# Patient Record
Sex: MALE | Race: WHITE | NOT HISPANIC OR LATINO
[De-identification: names, ages, dates, MRNs, and addresses within clinical notes are randomized per-mention and may not be internally consistent; named-entity substitution may affect disease eponyms.]

---

## 2017-01-05 ENCOUNTER — MOBILE ON CALL (OUTPATIENT)
Age: 22
End: 2017-01-05

## 2017-01-05 DIAGNOSIS — M51.16 INTERVERTEBRAL DISC DISORDERS WITH RADICULOPATHY, LUMBAR REGION: ICD-10-CM

## 2017-01-05 DIAGNOSIS — M54.41 LUMBAGO WITH SCIATICA, RIGHT SIDE: ICD-10-CM

## 2017-01-05 DIAGNOSIS — G89.29 LUMBAGO WITH SCIATICA, RIGHT SIDE: ICD-10-CM

## 2017-06-05 ENCOUNTER — FORM ENCOUNTER (OUTPATIENT)
Age: 22
End: 2017-06-05

## 2017-06-06 ENCOUNTER — APPOINTMENT (OUTPATIENT)
Dept: ORTHOPEDIC SURGERY | Facility: CLINIC | Age: 22
End: 2017-06-06

## 2017-06-06 ENCOUNTER — OUTPATIENT (OUTPATIENT)
Dept: OUTPATIENT SERVICES | Facility: HOSPITAL | Age: 22
LOS: 1 days | End: 2017-06-06
Payer: COMMERCIAL

## 2017-06-06 VITALS
SYSTOLIC BLOOD PRESSURE: 120 MMHG | WEIGHT: 191 LBS | DIASTOLIC BLOOD PRESSURE: 70 MMHG | BODY MASS INDEX: 25.31 KG/M2 | HEIGHT: 73 IN

## 2017-06-06 DIAGNOSIS — T14.8 OTHER INJURY OF UNSPECIFIED BODY REGION: ICD-10-CM

## 2017-06-06 PROCEDURE — 73590 X-RAY EXAM OF LOWER LEG: CPT

## 2017-06-06 PROCEDURE — 73590 X-RAY EXAM OF LOWER LEG: CPT | Mod: 26,LT

## 2017-10-30 ENCOUNTER — APPOINTMENT (OUTPATIENT)
Dept: VASCULAR SURGERY | Facility: CLINIC | Age: 22
End: 2017-10-30
Payer: COMMERCIAL

## 2017-10-30 ENCOUNTER — APPOINTMENT (OUTPATIENT)
Dept: ORTHOPEDIC SURGERY | Facility: CLINIC | Age: 22
End: 2017-10-30
Payer: COMMERCIAL

## 2017-10-30 PROCEDURE — 99213 OFFICE O/P EST LOW 20 MIN: CPT

## 2017-10-30 PROCEDURE — 93971 EXTREMITY STUDY: CPT

## 2018-05-15 ENCOUNTER — APPOINTMENT (OUTPATIENT)
Dept: ORTHOPEDIC SURGERY | Facility: CLINIC | Age: 23
End: 2018-05-15
Payer: COMMERCIAL

## 2018-05-15 DIAGNOSIS — M25.461 EFFUSION, RIGHT KNEE: ICD-10-CM

## 2018-05-15 PROCEDURE — 99214 OFFICE O/P EST MOD 30 MIN: CPT

## 2018-05-15 RX ORDER — TRAMADOL HYDROCHLORIDE 50 MG/1
50 TABLET, COATED ORAL
Qty: 30 | Refills: 0 | Status: DISCONTINUED | COMMUNITY
Start: 2017-10-30 | End: 2018-05-15

## 2018-05-25 ENCOUNTER — APPOINTMENT (OUTPATIENT)
Dept: PEDIATRICS | Facility: CLINIC | Age: 23
End: 2018-05-25
Payer: COMMERCIAL

## 2018-05-25 VITALS — TEMPERATURE: 98.1 F

## 2018-05-25 DIAGNOSIS — J35.8 OTHER CHRONIC DISEASES OF TONSILS AND ADENOIDS: ICD-10-CM

## 2018-05-25 PROCEDURE — 99214 OFFICE O/P EST MOD 30 MIN: CPT

## 2018-05-26 PROBLEM — J35.8 TONSILLOLITH: Status: ACTIVE | Noted: 2018-05-26

## 2018-05-26 NOTE — HISTORY OF PRESENT ILLNESS
[de-identified] : throat [FreeTextEntry6] : sees something"off/white" stuck on tonsils. insisted this be checked out

## 2018-05-26 NOTE — PHYSICAL EXAM
[No Acute Distress] : no acute distress [Normocephalic] : normocephalic [EOMI] : EOMI [Clear TM bilaterally] : clear tympanic membranes bilaterally [Nonerythematous Oropharynx] : nonerythematous oropharynx [Nontender Cervical Lymph Nodes] : nontender cervical lymph nodes [Clear to Ausculatation Bilaterally] : clear to auscultation bilaterally [Regular Rate and Rhythm] : regular rate and rhythm [Soft] : soft [NonTender] : non tender [Non Distended] : non distended [Malcolm: ____] : Malcolm [unfilled] [No Abnormal Lymph Nodes Palpated] : no abnormal lymph nodes palpated [Moves All Extremities x 4] : moves all extremities x4 [Straight] : straight [Normotonic] : normotonic [NL] : warm [de-identified] : off white concretion attached to R Tonsil, was able to remove most of it with Tongue blade

## 2018-06-12 ENCOUNTER — RX RENEWAL (OUTPATIENT)
Age: 23
End: 2018-06-12

## 2018-10-30 ENCOUNTER — APPOINTMENT (OUTPATIENT)
Dept: ORTHOPEDIC SURGERY | Facility: CLINIC | Age: 23
End: 2018-10-30
Payer: COMMERCIAL

## 2018-10-30 VITALS
DIASTOLIC BLOOD PRESSURE: 70 MMHG | SYSTOLIC BLOOD PRESSURE: 120 MMHG | BODY MASS INDEX: 25.84 KG/M2 | OXYGEN SATURATION: 97 % | HEIGHT: 73 IN | HEART RATE: 76 BPM | WEIGHT: 195 LBS

## 2018-10-30 PROCEDURE — 20610 DRAIN/INJ JOINT/BURSA W/O US: CPT | Mod: RT

## 2018-10-30 RX ORDER — HYALURONATE SOD, CROSS-LINKED 30 MG/3 ML
30 SYRINGE (ML) INTRAARTICULAR
Qty: 3 | Refills: 0 | Status: DISCONTINUED | COMMUNITY
Start: 2018-06-12 | End: 2018-10-30

## 2018-11-07 ENCOUNTER — APPOINTMENT (OUTPATIENT)
Dept: ORTHOPEDIC SURGERY | Facility: CLINIC | Age: 23
End: 2018-11-07
Payer: COMMERCIAL

## 2018-11-07 PROCEDURE — 20610 DRAIN/INJ JOINT/BURSA W/O US: CPT | Mod: RT

## 2018-11-14 ENCOUNTER — APPOINTMENT (OUTPATIENT)
Dept: ORTHOPEDIC SURGERY | Facility: CLINIC | Age: 23
End: 2018-11-14
Payer: COMMERCIAL

## 2018-11-14 PROCEDURE — 20610 DRAIN/INJ JOINT/BURSA W/O US: CPT | Mod: RT

## 2019-02-04 PROBLEM — M51.16 LUMBAR DISC HERNIATION WITH RADICULOPATHY: Status: ACTIVE | Noted: 2017-01-05

## 2019-08-21 ENCOUNTER — FORM ENCOUNTER (OUTPATIENT)
Age: 24
End: 2019-08-21

## 2019-08-22 ENCOUNTER — OUTPATIENT (OUTPATIENT)
Dept: OUTPATIENT SERVICES | Facility: HOSPITAL | Age: 24
LOS: 1 days | End: 2019-08-22
Payer: COMMERCIAL

## 2019-08-22 ENCOUNTER — APPOINTMENT (OUTPATIENT)
Dept: ORTHOPEDIC SURGERY | Facility: CLINIC | Age: 24
End: 2019-08-22
Payer: COMMERCIAL

## 2019-08-22 VITALS
HEIGHT: 73 IN | BODY MASS INDEX: 25.84 KG/M2 | WEIGHT: 195 LBS | SYSTOLIC BLOOD PRESSURE: 120 MMHG | HEART RATE: 72 BPM | OXYGEN SATURATION: 98 % | DIASTOLIC BLOOD PRESSURE: 60 MMHG

## 2019-08-22 PROCEDURE — 73564 X-RAY EXAM KNEE 4 OR MORE: CPT

## 2019-08-22 PROCEDURE — 99213 OFFICE O/P EST LOW 20 MIN: CPT

## 2019-08-22 PROCEDURE — 73564 X-RAY EXAM KNEE 4 OR MORE: CPT | Mod: 26,50

## 2019-09-16 NOTE — PHYSICAL EXAM
[de-identified] : \par The patient is a well developed, well nourished male in no apparent distress. He is alert and oriented X 3 with a pleasant mood and appropriate affect. \par \par \par On physical examination of the right knee,his ROM is 0-125 degrees. The patient walks with a normal gait and stands in neutral alignment. There is no effusion. No warmth or erythema is noted. The patella is non tender to palpation medially or laterally. There is no crepitus noted. The apprehension and grind tests are negative. The extensor mechanism is intact. There is lateral joint line tenderness. The Keshav sign is positive. The Lachman and pivot shift tests are negative. There is no varus or valgus laxity at 0 or 30 degrees. No posterolateral or anteromedial laxity is noted. No masses are palpable. No other soft tissue or bony tenderness is noted. Quadriceps weakness is noted. Neurovascular function is intact.   [de-identified] : Radiographs performed today show well maintained joint spacing in both knees.

## 2019-09-16 NOTE — DISCUSSION/SUMMARY
[de-identified] : Rishabh had an MRI in 2018 of his right knee and will send us the disc to evaluate. We will call him with those results. he will continue on with activities as tolerated. All questions were answered.

## 2019-09-16 NOTE — HISTORY OF PRESENT ILLNESS
[de-identified] : Rishabh returns today for evaluation of his right knee. he reports progressive right knee pain and stiffness. he has medial knee discomfort and difficulty walking long distances. He has been unable to exercise due to his right knee issues. He denies any locking or buckling.

## 2019-09-16 NOTE — END OF VISIT
[FreeTextEntry3] : All medical record entries made by MARGE Dominguez, acting as a scribe for this encounter under the direction of Quentin Pena MD . I have reviewed the chart and agree that the record accurately reflects my personal performance of the history, physical exam, assessment and plan. I have also personally directed, reviewed, and agreed with the chart. \par \par

## 2019-09-20 NOTE — REVIEW OF SYSTEMS
[Joint Pain] : joint pain [Joint Stiffness] : joint stiffness [Joint Swelling] : no joint swelling [Negative] : Heme/Lymph [FreeTextEntry1] : PSG: AHI-21, REM-50-2

## 2019-11-08 ENCOUNTER — APPOINTMENT (OUTPATIENT)
Dept: ORTHOPEDIC SURGERY | Facility: CLINIC | Age: 24
End: 2019-11-08
Payer: COMMERCIAL

## 2019-11-08 VITALS
WEIGHT: 195 LBS | DIASTOLIC BLOOD PRESSURE: 70 MMHG | BODY MASS INDEX: 25.84 KG/M2 | HEIGHT: 73 IN | SYSTOLIC BLOOD PRESSURE: 120 MMHG

## 2019-11-08 PROCEDURE — 99213 OFFICE O/P EST LOW 20 MIN: CPT

## 2019-11-11 NOTE — HISTORY OF PRESENT ILLNESS
[de-identified] : Rishabh returns today for evaluation of his right knee. He has had persistent lateral knee pain and stiffness for the last year. He has a lateral meniscus tear and lateral compartment DJD. He has tried HA injections without lasting relief. He is unable to exercise and has limitations in his ADLS due to pain and stiffness. He denies any locking or buckling. he has tried PT and Nsaids without improvement.

## 2019-11-11 NOTE — PHYSICAL EXAM
[de-identified] : \par The patient is a well developed, well nourished male in no apparent distress. He is alert and oriented X 3 with a pleasant mood and appropriate affect. \par \par \par On physical examination of the right knee,his ROM is 0-125 degrees. The patient walks with a normal gait and stands in neutral alignment. There is no effusion. No warmth or erythema is noted. The patella is non tender to palpation medially or laterally. There is no crepitus noted. The apprehension and grind tests are negative. The extensor mechanism is intact. There is lateral joint line tenderness. The Keshav sign is positive. The Lachman and pivot shift tests are negative. There is no varus or valgus laxity at 0 or 30 degrees. No posterolateral or anteromedial laxity is noted. No masses are palpable. No other soft tissue or bony tenderness is noted. Quadriceps weakness is noted. Neurovascular function is intact.   [de-identified] : MRI of the right knee shows a complex tear of the lateral meniscus with superimposed lateral compartment DJD

## 2019-11-11 NOTE — END OF VISIT
[FreeTextEntry3] : All medical record entries made by MARGE Dominguez, acting as a scribe for this encounter under the direction of uQentin Pena MD . I have reviewed the chart and agree that the record accurately reflects my personal performance of the history, physical exam, assessment and plan. I have also personally directed, reviewed, and agreed with the chart. \par \par

## 2019-11-11 NOTE — DISCUSSION/SUMMARY
[de-identified] : Rishabh has a symptomatic lateral meniscus tear. He has failed multiple conservative management tracks. Arthroscopy was recommended. The risks, benefits and potential complications were discussed at length. he understands that inherent risk of worsening DJD. He will schedule surgery at his earliest convenience. All questions were answered. He will call if any issues arise.

## 2019-12-18 ENCOUNTER — RX RENEWAL (OUTPATIENT)
Age: 24
End: 2019-12-18

## 2019-12-19 ENCOUNTER — APPOINTMENT (OUTPATIENT)
Dept: ORTHOPEDIC SURGERY | Facility: AMBULATORY SURGERY CENTER | Age: 24
End: 2019-12-19

## 2019-12-19 ENCOUNTER — OUTPATIENT (OUTPATIENT)
Dept: OUTPATIENT SERVICES | Facility: HOSPITAL | Age: 24
LOS: 1 days | Discharge: HOME | End: 2019-12-19
Payer: COMMERCIAL

## 2019-12-19 PROCEDURE — 29881 ARTHRS KNE SRG MNISECTMY M/L: CPT | Mod: RT

## 2019-12-30 ENCOUNTER — APPOINTMENT (OUTPATIENT)
Dept: ORTHOPEDIC SURGERY | Facility: CLINIC | Age: 24
End: 2019-12-30
Payer: COMMERCIAL

## 2019-12-30 PROCEDURE — 99024 POSTOP FOLLOW-UP VISIT: CPT

## 2020-01-21 NOTE — HISTORY OF PRESENT ILLNESS
[Excellent Pain Control] : has excellent pain control [No Sign of Infection] : is showing no signs of infection [de-identified] : Rishabh returns today for evaluation of his right knee. He is one week s.p right knee arthroscopy with partial lateral menisectomy [de-identified] : Rishabh is doing quite well. He denies any significant pain. He has started supervised PT and is working on his ROM. He denies any fevers or chills [de-identified] : On exam of his right knee, his ROM is 0-100 degrees. Wounds are clean, dry and intact. there is no warmth or erythema noted. There is trace effusion. there is mild quad atrophy noted.  [de-identified] : Rishabh is doing well.  [de-identified] : Under strict sterile technique, sutures were removed and steri strips applied. He will continue on with supervised PT. We will see him back in one month for reevaluation. All questions were answered. He will call if any issues arise.

## 2021-10-28 ENCOUNTER — APPOINTMENT (OUTPATIENT)
Dept: ORTHOPEDIC SURGERY | Facility: CLINIC | Age: 26
End: 2021-10-28
Payer: COMMERCIAL

## 2021-10-28 ENCOUNTER — RESULT REVIEW (OUTPATIENT)
Age: 26
End: 2021-10-28

## 2021-10-28 ENCOUNTER — OUTPATIENT (OUTPATIENT)
Dept: OUTPATIENT SERVICES | Facility: HOSPITAL | Age: 26
LOS: 1 days | End: 2021-10-28
Payer: COMMERCIAL

## 2021-10-28 VITALS
DIASTOLIC BLOOD PRESSURE: 72 MMHG | OXYGEN SATURATION: 98 % | HEIGHT: 73 IN | SYSTOLIC BLOOD PRESSURE: 131 MMHG | HEART RATE: 72 BPM | BODY MASS INDEX: 23.86 KG/M2 | WEIGHT: 180 LBS

## 2021-10-28 PROCEDURE — 99213 OFFICE O/P EST LOW 20 MIN: CPT

## 2021-10-28 PROCEDURE — 73564 X-RAY EXAM KNEE 4 OR MORE: CPT | Mod: 26,50

## 2021-10-28 PROCEDURE — 73564 X-RAY EXAM KNEE 4 OR MORE: CPT

## 2021-10-28 NOTE — HISTORY OF PRESENT ILLNESS
[de-identified] : The patient is a 26 year old man presenting with right knee pain.\par \par He follows with Dr. Pena.\par \par He is s/p left ACL reconstruction in 2015 and right partial lateral meniscectomy in December 2019.\par \par He presents with a 1 week history of acute, diffuse right knee pain with mild swelling.  He thinks he may have injured his knee playing basketball but does not recall a specific injury or mechanism.  He developed mild swelling and stiffness.  Pain worse with knee extension and lying supine.  No significant buckling or locking.\par \par Pain is mild in intensity, described as sharp, improved with rest, worse with walking.

## 2021-10-28 NOTE — PHYSICAL EXAM
[de-identified] : General: Well-nourished, well-developed, alert, and in no acute distress.\par Head: Normocephalic.\par Eyes: Pupils equal round reactive to light and accommodation, extraocular muscles intact, normal sclera.\par Nose: No nasal discharge.\par Cardiac: Regular rate. Extremities are warm and well perfused. Distal pulses are symmetric bilaterally.\par Respiratory: No labored breathing.\par Extremities: Sensation is intact distally bilaterally.  Distal pulses are symmetric bilaterally\par Lymphatic: No regional lymphadenopathy, no lymphedema\par Neurologic: No focal deficits\par Skin: Normal skin color, texture, and turgor\par Psychiatric: Normal affect\par MSK: as noted above/below\par \par \par \par RIGHT KNEE:\par \par Inspection: no bruising, erythema\par Joint Effusion:TRACE \par ROM: Knee Flexion 120 WITH PAIN , Knee Extension 0\par Palpation:LATERAL>MEDIAL JOINT LINE PAIN, PERIPATELLAR PAIN, No pain at patellar tendon, MFC/LFC, Medial/Lateral Tibial Plateau\par Leg Length Discrepancy:no\par Patella: no apprehension, +COMPRESSION\par Distal Pulses: normal\par Lower Extremity Strength:normal, 5/5 \par Lower Extremity Reflexes:normal, 2+\par Lower Extremity Sensation: normal\par \par Special Tests:\par Tuan:Negative \par Cynthia: Negative\par Anterior Drawer:Negative\par Anterior Lachman:Negative\par Posterior Drawer:Negative \par Varus/Valgus:Negative, no instability\par \par LEFT KNEE:\par \par Inspection: no bruising, swelling, erythema, HEALED SURGICAL SCAR\par Joint Effusion:no \par ROM: Knee Flexion 120-130 , Knee Extension 0\par Palpation:No pain at joint line, patellar tendon, MFC/LFC, Medial/Lateral Tibial Plateau\par Leg Length Discrepancy:no\par Patella: no apprehension\par Distal Pulses: normal\par Lower Extremity Strength:normal, 5/5 \par Lower Extremity Reflexes:normal, 2+\par Lower Extremity Sensation: normal\par \par Special Tests:\par Tuan:Negative \par Cynthia: Negative\par Anterior Drawer:Negative\par Anterior Lachman:Negative\par Posterior Drawer:Negative \par Varus/Valgus:Negative, no instability\par  [de-identified] : Xray Bilateral Knees - Multiple views were reviewed with the patient in detail.  There is no acute fracture or dislocation.  There is no joint effusion.  S/p left knee ACL reconstruction.  Right lateral compartment narrowing.  We will await formal radiology reading.\par \par \par

## 2021-10-28 NOTE — DISCUSSION/SUMMARY
[Medication Risks Reviewed] : Medication risks reviewed [de-identified] : The patient is a 26 year old man s/p left ACL reconstruction in 2015 and right partial lateral meniscectomy in December 2019 presenting with a 1 week history of acute right knee pain after playing basketball.  He has interval right lateral joint space narrowing s/p remote right knee arthroscopy.  He has effusion.  Suspect component of posttraumatic arthritis, but will rule out displaced meniscus tear.\par \par Imaging/Diagnostics/Medical Records were interpreted and/or reviewed and results were reviewed with the patient in detail.  All questions were answered appropriately.\par \par The patient was counseled on the natural progression of the problem(s) today and potential complications of diagnoses.  The patient was provided reassurance today.\par \par MRI of the right knee ordered today.\par \par Trial NSAIDs.\par \par Patient was prescribed a short course of Etodolac 400mg BIDWM x 2 weeks.Appropriate use of medication was reviewed with the patient in detail. Risks, benefits, and adverse effects medication were discussed.\par \par Pending results, discussed potential injections, CSI vs. HA.\par \par Follow-up after MRI.\par \par ------------------------------------------------------------------------------------------------------------------\par Patient appreciates and agrees with current plan.\par \par The patient's diagnosis above was evaluated by me, personally.  Diagnostic Testing and treatment options were discussed with the patient in detail.  The risks/benefits/potential complications of diagnostic testing/treatments were described in detail.  \par \par This note was generated using a mixture of manual typing and dragon medical dictation software.  A reasonable effort has been made for proofreading its contents, but typos may still remain.  If there are any questions or points of clarification needed please notify my office.\par \par \par >30 minutes of time was spent on total encounter.  >50% of the visit was spent on face-to-face counseling/coordination of care and medical-decision making for this patient.\par \par \par

## 2021-12-20 ENCOUNTER — NON-APPOINTMENT (OUTPATIENT)
Age: 26
End: 2021-12-20

## 2022-01-04 ENCOUNTER — APPOINTMENT (OUTPATIENT)
Dept: ORTHOPEDIC SURGERY | Facility: CLINIC | Age: 27
End: 2022-01-04
Payer: COMMERCIAL

## 2022-01-04 VITALS
DIASTOLIC BLOOD PRESSURE: 80 MMHG | SYSTOLIC BLOOD PRESSURE: 130 MMHG | HEIGHT: 73 IN | TEMPERATURE: 98 F | HEART RATE: 79 BPM | BODY MASS INDEX: 23.86 KG/M2 | OXYGEN SATURATION: 98 % | WEIGHT: 180 LBS

## 2022-01-04 PROCEDURE — 99213 OFFICE O/P EST LOW 20 MIN: CPT

## 2022-01-04 PROCEDURE — XXXXX: CPT

## 2022-01-04 RX ORDER — HYDROCODONE BITARTRATE AND ACETAMINOPHEN 5; 325 MG/1; MG/1
5-325 TABLET ORAL
Qty: 20 | Refills: 0 | Status: COMPLETED | COMMUNITY
Start: 2019-12-18 | End: 2022-01-04

## 2022-01-04 RX ORDER — DOXYCYCLINE HYCLATE 100 MG/1
100 TABLET ORAL
Qty: 21 | Refills: 0 | Status: ACTIVE | COMMUNITY
Start: 2021-08-27

## 2022-01-04 RX ORDER — METHYLPHENIDATE HYDROCHLORIDE 27 MG/1
27 TABLET, EXTENDED RELEASE ORAL
Qty: 30 | Refills: 0 | Status: ACTIVE | COMMUNITY
Start: 2021-11-05

## 2022-01-04 RX ORDER — AZITHROMYCIN 250 MG/1
250 TABLET, FILM COATED ORAL
Qty: 6 | Refills: 0 | Status: COMPLETED | COMMUNITY
Start: 2021-10-17

## 2022-01-12 NOTE — PHYSICAL EXAM
[de-identified] : He stands in neutral alignment. No effusion. Full range of motion. Stable. Some lateral joint line discomfort on palpation. Patella not tender.  [de-identified] : A recent MRI reveals lack of the posterior horn of te lateral meniscus. The articular cartilage in the lateral compartment is preserved at this time.

## 2022-01-12 NOTE — DISCUSSION/SUMMARY
[Medication Risks Reviewed] : Medication risks reviewed [Surgical risks reviewed] : Surgical risks reviewed [de-identified] : He has recurrent overload secondary to lack of meniscus tissue I believe. We could consider a meniscus allograft and possibly an osteotomy. At this time, we will try an HA approach which was successful in the past.\par \par We will obtain preauthorization for Durolane and we will contact him once the medication arrives at our office to schedule an appointment to administer the HA injectable.

## 2022-01-12 NOTE — HISTORY OF PRESENT ILLNESS
[de-identified] : Osmani returns today for follow up evaluation of  the right knee. He underwent a MRI of the right knee on 12.20.2021 and he is here to review the results.\par \par He had undergone an arthroscopic partial lateral meniscectomy in the past. As a significant portion of the meniscus was torn, he now has essentially a complete lateral meniscectomy. He has been treated in the past with an  brace and an HA injection for some symptoms of overload. He did well with that program. Recently his sytoms returned while playing sports.

## 2022-01-20 ENCOUNTER — APPOINTMENT (OUTPATIENT)
Dept: ORTHOPEDIC SURGERY | Facility: CLINIC | Age: 27
End: 2022-01-20
Payer: COMMERCIAL

## 2022-01-20 VITALS
HEART RATE: 65 BPM | DIASTOLIC BLOOD PRESSURE: 75 MMHG | OXYGEN SATURATION: 99 % | SYSTOLIC BLOOD PRESSURE: 131 MMHG | HEIGHT: 73 IN | WEIGHT: 180 LBS | BODY MASS INDEX: 23.86 KG/M2

## 2022-01-20 PROCEDURE — 20610 DRAIN/INJ JOINT/BURSA W/O US: CPT | Mod: RT

## 2022-01-21 NOTE — PROCEDURE
[de-identified] : cc: right knee pain and stiffness\par DX: right knee DJD \par Under strict sterile technique, the right knee was prepped with Betadine. Using the superolateral approach, with the patient supine, a 2mL injection of Euflexxa was administered intra-articularly . The patient tolerated the procedure well. The patient was instructed to avoid vigorous exercise for 24 hours and will apply ice to the knee for 20 minutes 2-3 times per day if discomfort occurs. Patient will return next week for the next injection in the series. The patient will call if any questions or problems should arise.\par \par \par right knee EUFLEXXA INJECTION \par lot: [id number]\par exp: 2023-02-15\par ndc: [id number]\par man: sophie

## 2022-01-27 ENCOUNTER — APPOINTMENT (OUTPATIENT)
Dept: ORTHOPEDIC SURGERY | Facility: CLINIC | Age: 27
End: 2022-01-27
Payer: COMMERCIAL

## 2022-01-27 PROCEDURE — 20610 DRAIN/INJ JOINT/BURSA W/O US: CPT | Mod: RT

## 2022-01-27 NOTE — PROCEDURE
[de-identified] : cc: right knee pain and stiffness\par DX: right knee DJD \par Under strict sterile technique, the right knee was prepped with Betadine. Using the superolateral approach, with the patient supine, a 2mL injection of Euflexxa was administered intra-articularly . The patient tolerated the procedure well. The patient was instructed to avoid vigorous exercise for 24 hours and will apply ice to the knee for 20 minutes 2-3 times per day if discomfort occurs. Patient will return next week for the next injection in the series. The patient will call if any questions or problems should arise.\par \par \par right knee EUFLEXXA INJECTION \par lot: [id number]\par exp: 2023-02-15\par ndc: [id number]\par man: sophie

## 2022-01-28 RX ORDER — HYALURONATE SODIUM, STABILIZED 60 MG/3 ML
60 SYRINGE (ML) INTRAARTICULAR
Qty: 1 | Refills: 0 | Status: DISCONTINUED | COMMUNITY
Start: 2022-01-05 | End: 2022-01-28

## 2022-02-01 ENCOUNTER — APPOINTMENT (OUTPATIENT)
Dept: ORTHOPEDIC SURGERY | Facility: CLINIC | Age: 27
End: 2022-02-01

## 2022-02-10 ENCOUNTER — APPOINTMENT (OUTPATIENT)
Dept: ORTHOPEDIC SURGERY | Facility: CLINIC | Age: 27
End: 2022-02-10
Payer: COMMERCIAL

## 2022-02-10 VITALS
HEIGHT: 73 IN | SYSTOLIC BLOOD PRESSURE: 130 MMHG | BODY MASS INDEX: 23.86 KG/M2 | WEIGHT: 180 LBS | DIASTOLIC BLOOD PRESSURE: 75 MMHG

## 2022-02-10 PROCEDURE — 20610 DRAIN/INJ JOINT/BURSA W/O US: CPT | Mod: RT

## 2022-04-06 NOTE — PROCEDURE
[de-identified] : cc" right knee pain and stiffness\par DX: right knee DKD \par \par The patient returns today for the third injection in the series. Under strict sterile technique, the right knee was prepped with Betadine. Using the superolateral approach, with the patient supine, a 2mL injection of Euflexxa was administered intra-articularly. The patient tolerated the procedure well. The patient was instructed to avoid vigorous exercise for 24 hours and will apply ice to the knee for 20 minutes 2-3 times per day if discomfort occurs. The patient will return for follow up on an as needed basis and will call if any additional questions or problems should arise.\par \par \par RIGHT KNEE EUFLEXXA INJ\par Lot: [id number]\par Exp: 2023-02-15\par NDC:[id number]\par Man: Tomás

## 2022-04-28 ENCOUNTER — APPOINTMENT (OUTPATIENT)
Dept: ORTHOPEDIC SURGERY | Facility: CLINIC | Age: 27
End: 2022-04-28
Payer: COMMERCIAL

## 2022-04-28 VITALS
OXYGEN SATURATION: 100 % | DIASTOLIC BLOOD PRESSURE: 73 MMHG | WEIGHT: 180 LBS | HEART RATE: 65 BPM | BODY MASS INDEX: 23.86 KG/M2 | SYSTOLIC BLOOD PRESSURE: 132 MMHG | HEIGHT: 73 IN

## 2022-04-28 PROCEDURE — 99213 OFFICE O/P EST LOW 20 MIN: CPT

## 2022-04-28 RX ORDER — ETODOLAC 400 MG/1
400 TABLET, FILM COATED ORAL TWICE DAILY
Qty: 60 | Refills: 1 | Status: ACTIVE | COMMUNITY
Start: 2021-10-28 | End: 1900-01-01

## 2022-04-29 RX ORDER — MELOXICAM 15 MG/1
15 TABLET ORAL
Qty: 7 | Refills: 0 | Status: COMPLETED | COMMUNITY
Start: 2022-01-26

## 2022-04-29 RX ORDER — BACLOFEN 10 MG/1
10 TABLET ORAL
Qty: 30 | Refills: 0 | Status: COMPLETED | COMMUNITY
Start: 2022-01-26

## 2022-04-29 NOTE — DISCUSSION/SUMMARY
[de-identified] : Rishabh has symptomatic Lateral compartment DJD in his right knee and PF pain syndrome in his left knee. He will begin a course of Lodine 400mg bid with food for the next two weeks. He understands that he may ultimately require a lateral meniscus transplant. He will continue on with activities as tolerated. He will return on an as needed basis. He will call if any issues arise

## 2022-04-29 NOTE — HISTORY OF PRESENT ILLNESS
[de-identified] : Rishabh presents today for evalaution of both knees. He has lateral compartment DJD in his right knee. He completed a series of HA injections two months ago and reports no relief. He has persistent lateral knee pain and intermittent swelling. He denies any locking or buckling. He has been cycling for exercise and now reports some anterior left knee pain as well. He has pain when running and with deep knee flexion. He denies any left knee swelling or instability

## 2022-04-29 NOTE — PHYSICAL EXAM
[de-identified] : \par The patient is a well developed, well nourished male in no apparent distress. He is alert and oriented X 3 with a pleasant mood and appropriate affect. \par \par \par On physical examination of the right knee,his ROM is 0-125 degrees. The patient walks with a normal gait and stands in neutral alignment. There is no effusion. No warmth or erythema is noted. The patella is non tender to palpation medially or laterally. There is no crepitus noted. The apprehension and grind tests are negative. The extensor mechanism is intact. There is lateral joint line tenderness. The Keshav sign is positive. The Lachman and pivot shift tests are negative. There is no varus or valgus laxity at 0 or 30 degrees. No posterolateral or anteromedial laxity is noted. No masses are palpable. No other soft tissue or bony tenderness is noted. Quadriceps weakness is noted. Neurovascular function is intact.\par \par   On physical examination of the  left knee,his ROM is 0-125 degrees. The patient walks with a normal gait and stands in neutral alignment. There is no effusion. No warmth or erythema is noted. The patella is mildly  tender to palpation medially and  laterally. There is no crepitus noted. The apprehension and grind tests are negative. The extensor mechanism is intact. There is no  joint line tenderness. The Keshav sign is negative. The Lachman and pivot shift tests are negative. There is no varus or valgus laxity at 0 or 30 degrees. No posterolateral or anteromedial laxity is noted. No masses are palpable. No other soft tissue or bony tenderness is noted. Quadriceps weakness is noted. Neurovascular function is intact.\par

## 2022-04-29 NOTE — END OF VISIT
[FreeTextEntry3] : All medical record entries made by MARGE Dominguez, acting as a scribe for this encounter under the direction of Quentin Pena MD . I have reviewed the chart and agree that the record accurately reflects my personal performance of the history, physical exam, assessment and plan. I have also personally directed, reviewed, and agreed with the chart.

## 2022-09-15 ENCOUNTER — APPOINTMENT (OUTPATIENT)
Dept: ORTHOPEDIC SURGERY | Facility: CLINIC | Age: 27
End: 2022-09-15
Payer: COMMERCIAL

## 2022-09-15 VITALS
HEIGHT: 73 IN | BODY MASS INDEX: 23.86 KG/M2 | SYSTOLIC BLOOD PRESSURE: 144 MMHG | WEIGHT: 180 LBS | HEART RATE: 75 BPM | DIASTOLIC BLOOD PRESSURE: 86 MMHG

## 2022-09-15 DIAGNOSIS — S86.819A STRAIN OF OTHER MUSCLE(S) AND TENDON(S) AT LOWER LEG LEVEL, UNSPECIFIED LEG, INITIAL ENCOUNTER: ICD-10-CM

## 2022-09-15 PROCEDURE — 99214 OFFICE O/P EST MOD 30 MIN: CPT

## 2022-09-15 NOTE — DISCUSSION/SUMMARY
[de-identified] : 27 yo male with 3 nights of calf pain following a 100 mile bike ride last month, no known trauma and this has exacerbated in the past week with the difficulty sleeping. . He also drives back and forth from [place] to [place] which aggravates his calf pain. \par No classic CECS symptoms and it comes on at rest as well. He most likely has a calf strain following his bike ride exacerbated by the  long car rides.\par \par Plan\par Medrol dose pk\par followed by Meloxicam\par PT\par F/u 3 weeks, if no improvement consider further work up re calf strain vs exertional compartment syndrome.

## 2022-09-16 RX ORDER — METHYLPREDNISOLONE 4 MG/1
4 TABLET ORAL
Qty: 1 | Refills: 0 | Status: ACTIVE | COMMUNITY
Start: 2022-09-16 | End: 1900-01-01

## 2022-12-06 ENCOUNTER — APPOINTMENT (OUTPATIENT)
Dept: ORTHOPEDIC SURGERY | Facility: CLINIC | Age: 27
End: 2022-12-06
Payer: COMMERCIAL

## 2022-12-06 VITALS — WEIGHT: 200 LBS | HEIGHT: 73 IN | BODY MASS INDEX: 26.51 KG/M2

## 2022-12-06 DIAGNOSIS — M79.661 PAIN IN RIGHT LOWER LEG: ICD-10-CM

## 2022-12-06 DIAGNOSIS — M21.6X1 OTHER ACQUIRED DEFORMITIES OF RIGHT FOOT: ICD-10-CM

## 2022-12-06 PROCEDURE — 99214 OFFICE O/P EST MOD 30 MIN: CPT

## 2022-12-12 ENCOUNTER — APPOINTMENT (OUTPATIENT)
Dept: ORTHOPEDIC SURGERY | Facility: CLINIC | Age: 27
End: 2022-12-12

## 2023-01-13 RX ORDER — HYALURONATE SODIUM, STABILIZED 60 MG/3 ML
60 SYRINGE (ML) INTRAARTICULAR
Qty: 1 | Refills: 0 | Status: ACTIVE | COMMUNITY
Start: 2023-01-13

## 2023-01-24 ENCOUNTER — APPOINTMENT (OUTPATIENT)
Dept: ORTHOPEDIC SURGERY | Facility: CLINIC | Age: 28
End: 2023-01-24
Payer: COMMERCIAL

## 2023-01-24 VITALS
HEIGHT: 73 IN | OXYGEN SATURATION: 98 % | WEIGHT: 200 LBS | SYSTOLIC BLOOD PRESSURE: 117 MMHG | HEART RATE: 65 BPM | BODY MASS INDEX: 26.51 KG/M2 | DIASTOLIC BLOOD PRESSURE: 73 MMHG | TEMPERATURE: 98.3 F

## 2023-01-24 PROCEDURE — 99213 OFFICE O/P EST LOW 20 MIN: CPT

## 2023-01-25 NOTE — DISCUSSION/SUMMARY
[de-identified] : Rishabh has symptomatic lateral compartment DJD in his right knee which was exacerbated by volleyball. he will be sent for an MRI to evalaute his lateral meniscus. He understands that he may ultimately require a lateral meniscal transplant. We will order an HA injection for him in the interim. All questions were answered. we will call him with the MRI results. He will call if any issues arise

## 2023-01-25 NOTE — END OF VISIT
[FreeTextEntry3] : All medical record entries made by MARGE Dominguez, acting as a scribe for this encounter under the direction of Quentin Pena MD. I have reviewed the chart and agree that the record accurately reflects my personal performance of the history, physical exam, assessment and plan. I have also personally directed, reviewed, and agreed with the chart.

## 2023-01-25 NOTE — PHYSICAL EXAM
[de-identified] : \par The patient is a well developed, well nourished male in no apparent distress. He is alert and oriented X 3 with a pleasant mood and appropriate affect. \par \par \par On physical examination of the right knee,his ROM is 0-125 degrees. The patient walks with a normal gait and stands in neutral alignment. There is no effusion. No warmth or erythema is noted. The patella is non tender to palpation medially or laterally. There is no crepitus noted. The apprehension and grind tests are negative. The extensor mechanism is intact. There is lateral joint line tenderness. The Keshav sign is positive. The Lachman and pivot shift tests are negative. There is no varus or valgus laxity at 0 or 30 degrees. No posterolateral or anteromedial laxity is noted. No masses are palpable. No other soft tissue or bony tenderness is noted. Quadriceps weakness is noted. Neurovascular function is intact.\par \par   On physical examination of the  left knee,his ROM is 0-125 degrees. The patient walks with a normal gait and stands in neutral alignment. There is no effusion. No warmth or erythema is noted. The patella is mildly  tender to palpation medially and  laterally. There is no crepitus noted. The apprehension and grind tests are negative. The extensor mechanism is intact. There is no  joint line tenderness. The Keshav sign is negative. The Lachman and pivot shift tests are negative. There is no varus or valgus laxity at 0 or 30 degrees. No posterolateral or anteromedial laxity is noted. No masses are palpable. No other soft tissue or bony tenderness is noted. Quadriceps weakness is noted. Neurovascular function is intact.\par

## 2023-01-25 NOTE — HISTORY OF PRESENT ILLNESS
[de-identified] : Rishabh returns today for evaluation of his right knee. He has a longstanding h.o lateral compartment DJD. He has been working with Fara Benavides and Dorita for a right calf issue that started in the fall after he did a very long bike race. He has been taking Gabapentin intermittently with some improvement in his calf issues. He now reports a recent increase in lateral right knee pain. he played in a [employer] volleyball tournament two weeks ago and developed significant lateral knee pain and stiffness. He has pain with activities and now at rest. he denies any locking or buckling

## 2023-02-07 ENCOUNTER — APPOINTMENT (OUTPATIENT)
Dept: ORTHOPEDIC SURGERY | Facility: CLINIC | Age: 28
End: 2023-02-07
Payer: COMMERCIAL

## 2023-02-08 NOTE — PROCEDURE
[de-identified] : MRI of the right knee was reviewed and shows worsening chondral damage LFC--will discuss case with Fara Kevin and Kennedy regarding lateral meniscal transplant with cartilage procedure\par \par cc: right knee pain and stiffness\par DX: right knee DJD \par \par Under strict sterile technique, the right knee was prepped with Betadine. Using the superolateral approach, with the patient supine, a 3mL injection of Durolane was administered intra-articularly. The patient tolerated the procedure well. The patient was instructed to avoid vigorous exercise for 48 hours and will apply ice to the knee for 20 minutes 2-3 times per day if discomfort occurs. Patient will return on an as needed basis. The patient will call if any questions or problems should arise.\par \par Durolane right knee\par lot#[id number]\par exp- 07- \par Man- [employer] \par NDC- [id number]

## 2023-04-03 ENCOUNTER — APPOINTMENT (OUTPATIENT)
Dept: ORTHOPEDIC SURGERY | Facility: CLINIC | Age: 28
End: 2023-04-03
Payer: COMMERCIAL

## 2023-04-03 DIAGNOSIS — Z98.890 OTHER SPECIFIED POSTPROCEDURAL STATES: ICD-10-CM

## 2023-04-03 PROCEDURE — 99213 OFFICE O/P EST LOW 20 MIN: CPT | Mod: 25

## 2023-04-03 PROCEDURE — 20610 DRAIN/INJ JOINT/BURSA W/O US: CPT

## 2023-04-18 ENCOUNTER — APPOINTMENT (OUTPATIENT)
Dept: ORTHOPEDIC SURGERY | Facility: CLINIC | Age: 28
End: 2023-04-18
Payer: COMMERCIAL

## 2023-04-18 VITALS — BODY MASS INDEX: 26.51 KG/M2 | HEIGHT: 73 IN | WEIGHT: 200 LBS

## 2023-04-18 PROCEDURE — 77073 BONE LENGTH STUDIES: CPT

## 2023-04-18 PROCEDURE — 99215 OFFICE O/P EST HI 40 MIN: CPT

## 2023-04-18 PROCEDURE — 73562 X-RAY EXAM OF KNEE 3: CPT | Mod: RT

## 2023-04-19 PROBLEM — Z98.890 S/P ARTHROSCOPIC KNEE SURGERY: Status: ACTIVE | Noted: 2021-10-28

## 2023-05-04 ENCOUNTER — NON-APPOINTMENT (OUTPATIENT)
Age: 28
End: 2023-05-04

## 2023-05-05 ENCOUNTER — NON-APPOINTMENT (OUTPATIENT)
Age: 28
End: 2023-05-05

## 2023-05-11 ENCOUNTER — NON-APPOINTMENT (OUTPATIENT)
Age: 28
End: 2023-05-11

## 2023-05-24 NOTE — DISCUSSION/SUMMARY
[de-identified] : Rishabh has symptomatic lateral compartment DJD in his right knee. He received a cortisone injection today.  He understands that he may ultimately require a lateral meniscal transplant. He will be referred to Dr Kevin in that regard. All questions were answered.  He will call if any issues arise

## 2023-05-24 NOTE — PHYSICAL EXAM
[de-identified] : \par The patient is a well developed, well nourished male in no apparent distress. He is alert and oriented X 3 with a pleasant mood and appropriate affect. \par \par \par On physical examination of the right knee,his ROM is 0-125 degrees. The patient walks with a normal gait and stands in neutral alignment. There is norace  effusion. No warmth or erythema is noted. The patella is non tender to palpation medially or laterally. There is no crepitus noted. The apprehension and grind tests are negative. The extensor mechanism is intact. There is lateral joint line tenderness. The Keshav sign is positive. The Lachman and pivot shift tests are negative. There is no varus or valgus laxity at 0 or 30 degrees. No posterolateral or anteromedial laxity is noted. No masses are palpable. No other soft tissue or bony tenderness is noted. Quadriceps weakness is noted. Neurovascular function is intact.\par \par   On physical examination of the  left knee,his ROM is 0-125 degrees. The patient walks with a normal gait and stands in neutral alignment. There is no effusion. No warmth or erythema is noted. The patella is mildly  tender to palpation medially and  laterally. There is no crepitus noted. The apprehension and grind tests are negative. The extensor mechanism is intact. There is no  joint line tenderness. The Keshav sign is negative. The Lachman and pivot shift tests are negative. There is no varus or valgus laxity at 0 or 30 degrees. No posterolateral or anteromedial laxity is noted. No masses are palpable. No other soft tissue or bony tenderness is noted. Quadriceps weakness is noted. Neurovascular function is intact.\par

## 2023-05-24 NOTE — PROCEDURE
[de-identified] : Under strict sterile technique, the right knee was prepped with Betadine. Using the superolateral approach, with the patient supine, 1ml of Kenalog was mixed with 5mls of 1% Lidocaine and 5mLs of 0.5% Marcaine, and was injected intraarticularly. The patient tolerated the procedure well. The patient was instructed to avoid vigorous exercise for 24 hours and will apply ice to the knee for 20 minutes 2-3 times per day if discomfort occurs. Patient will return on an as needed basis. The patient will call if any questions or problems should arise

## 2023-05-24 NOTE — PHYSICAL EXAM
[de-identified] : \par The patient is a well developed, well nourished male in no apparent distress. He is alert and oriented X 3 with a pleasant mood and appropriate affect. \par \par \par On physical examination of the right knee,his ROM is 0-125 degrees. The patient walks with a normal gait and stands in neutral alignment. There is norace  effusion. No warmth or erythema is noted. The patella is non tender to palpation medially or laterally. There is no crepitus noted. The apprehension and grind tests are negative. The extensor mechanism is intact. There is lateral joint line tenderness. The Keshav sign is positive. The Lachman and pivot shift tests are negative. There is no varus or valgus laxity at 0 or 30 degrees. No posterolateral or anteromedial laxity is noted. No masses are palpable. No other soft tissue or bony tenderness is noted. Quadriceps weakness is noted. Neurovascular function is intact.\par \par   On physical examination of the  left knee,his ROM is 0-125 degrees. The patient walks with a normal gait and stands in neutral alignment. There is no effusion. No warmth or erythema is noted. The patella is mildly  tender to palpation medially and  laterally. There is no crepitus noted. The apprehension and grind tests are negative. The extensor mechanism is intact. There is no  joint line tenderness. The Keshav sign is negative. The Lachman and pivot shift tests are negative. There is no varus or valgus laxity at 0 or 30 degrees. No posterolateral or anteromedial laxity is noted. No masses are palpable. No other soft tissue or bony tenderness is noted. Quadriceps weakness is noted. Neurovascular function is intact.\par

## 2023-05-24 NOTE — DISCUSSION/SUMMARY
[de-identified] : Rishabh has symptomatic lateral compartment DJD in his right knee. He received a cortisone injection today.  He understands that he may ultimately require a lateral meniscal transplant. He will be referred to Dr Kevin in that regard. All questions were answered.  He will call if any issues arise

## 2023-05-24 NOTE — HISTORY OF PRESENT ILLNESS
[de-identified] : Rishabh returns today for evaluation of his right knee. He did some light exercise and his knee swelled. He has lateral knee pain and decreaesd ROm. HE has had difficulty walking and pain with stairs. He denies any locking or buckling

## 2023-05-24 NOTE — PROCEDURE
[de-identified] : Under strict sterile technique, the right knee was prepped with Betadine. Using the superolateral approach, with the patient supine, 1ml of Kenalog was mixed with 5mls of 1% Lidocaine and 5mLs of 0.5% Marcaine, and was injected intraarticularly. The patient tolerated the procedure well. The patient was instructed to avoid vigorous exercise for 24 hours and will apply ice to the knee for 20 minutes 2-3 times per day if discomfort occurs. Patient will return on an as needed basis. The patient will call if any questions or problems should arise

## 2023-05-24 NOTE — HISTORY OF PRESENT ILLNESS
[de-identified] : Rishabh returns today for evaluation of his right knee. He did some light exercise and his knee swelled. He has lateral knee pain and decreaesd ROm. HE has had difficulty walking and pain with stairs. He denies any locking or buckling

## 2023-06-15 ENCOUNTER — APPOINTMENT (OUTPATIENT)
Dept: ORTHOPEDIC SURGERY | Facility: CLINIC | Age: 28
End: 2023-06-15
Payer: COMMERCIAL

## 2023-06-15 DIAGNOSIS — S83.281A OTHER TEAR OF LATERAL MENISCUS, CURRENT INJURY, RIGHT KNEE, INITIAL ENCOUNTER: ICD-10-CM

## 2023-06-15 PROCEDURE — 20610 DRAIN/INJ JOINT/BURSA W/O US: CPT | Mod: RT

## 2023-06-22 ENCOUNTER — APPOINTMENT (OUTPATIENT)
Dept: ORTHOPEDIC SURGERY | Facility: CLINIC | Age: 28
End: 2023-06-22
Payer: COMMERCIAL

## 2023-06-22 PROCEDURE — 20610 DRAIN/INJ JOINT/BURSA W/O US: CPT

## 2023-06-22 NOTE — PROCEDURE
[de-identified] : The right knee was prepped with alchohol and ethyl chloride was used to numb the skin. An injection of Eufflexxa( 1% NA Hyaluronate)  was then given without complication into the  joint through a superolateral portal.  Patient instructed that there may be an inflammatory flare for 24 hrs , to use ice or advil if needed. Lot umber [id number]\par  #2 euflexxa\par He will follow up with [facility] and Dr Kevin for his last injection \par lot # [id number]\par exp: 2023-10-21\par man: sophie\par ndc: [id number]

## 2023-07-06 ENCOUNTER — APPOINTMENT (OUTPATIENT)
Dept: ORTHOPEDIC SURGERY | Facility: CLINIC | Age: 28
End: 2023-07-06
Payer: COMMERCIAL

## 2023-07-06 DIAGNOSIS — M25.461 EFFUSION, RIGHT KNEE: ICD-10-CM

## 2023-07-06 PROCEDURE — 20611 DRAIN/INJ JOINT/BURSA W/US: CPT | Mod: RT

## 2023-07-26 ENCOUNTER — NON-APPOINTMENT (OUTPATIENT)
Age: 28
End: 2023-07-26

## 2023-07-26 ENCOUNTER — APPOINTMENT (OUTPATIENT)
Dept: NEUROLOGY | Facility: CLINIC | Age: 28
End: 2023-07-26
Payer: COMMERCIAL

## 2023-07-26 VITALS
HEART RATE: 84 BPM | OXYGEN SATURATION: 97 % | BODY MASS INDEX: 25.98 KG/M2 | DIASTOLIC BLOOD PRESSURE: 73 MMHG | SYSTOLIC BLOOD PRESSURE: 128 MMHG | HEIGHT: 73 IN | TEMPERATURE: 97.6 F | WEIGHT: 196 LBS

## 2023-07-26 DIAGNOSIS — G57.91 UNSPECIFIED MONONEUROPATHY OF RIGHT LOWER LIMB: ICD-10-CM

## 2023-07-26 PROCEDURE — 99203 OFFICE O/P NEW LOW 30 MIN: CPT

## 2023-07-26 NOTE — HISTORY OF PRESENT ILLNESS
[FreeTextEntry1] : JEANA WILKES is a 27 year who presents with right leg symptoms\par \par 9 years ago right MCL then months later right ACL tear. Chronic pain in right knee since then. Has had f/u scope that showed a lot of missing MCL.\par \par Then long bike ride led to pain in anterior ankle/foot going to the [place].  Saw Dr Kevin who thought he should see me about it.  There might be some tingling and burning as well.  Points to anterior/lateral foot up the TA and PL.  Feels it when driving mainly.  Denies weakness.  \par \par Generally crosses right knee over the left knee. \par \par Denies diplopia, blurred vision, dysphagia, dysarthria, aphasia, bowel or bladder dysfunction, imbalance, falls, headaches.\par

## 2023-07-26 NOTE — CONSULT LETTER
[Dear  ___] : Dear  [unfilled], [Courtesy Letter:] : I had the pleasure of seeing your patient, [unfilled], in my office today. [Please see my note below.] : Please see my note below. [Consult Closing:] : Thank you very much for allowing me to participate in the care of this patient.  If you have any questions, please do not hesitate to contact me. [Sincerely,] : Sincerely, [FreeTextEntry3] : Scott Callaway MD

## 2023-07-26 NOTE — PHYSICAL EXAM
[FreeTextEntry1] : General: this is a pleasant patient in no acute distress\par \par HEENT conjunctiva are normal, no tenderness in head\par \par CV: normal pulses, regular rate and rhythm, no peripheral edema noted\par \par Lungs: breathing is non-labored\par \par abd: soft and non-distended\par \par MSK:\par SLR: \par RASTA:\par range of motion:\par tinnels: \par spurling:\par Occipital nerve tenderness:\par \par Mental status:\par Alert and oriented to person, place and time, normal speech and comprehension\par \par Cranial Nerves:\par extra-occular movements in tact without nystagmus, normal saccades and smooth pursuit, Face symmetric and facial strength symmetric, facial sensation symmetric, \par \par Motor: normal bulk and tone throughout. no abnormal movements.  Full 5/5 strength uppers and lower extremities proximally and distally\par \par Sensory: in tact and symmetric to vibration, light tough, temperature\par \par Cerebellar: normal finger-nose-finger bilaterally\par \par Reflexes: 2+ in the upper and lower extremities and symmetric.  toes are bilaterally downgoing.\par \par Gait: stable, able to tip toe heel and tandem\par \par Stances:\par Romberg: normal\par

## 2024-01-23 ENCOUNTER — NON-APPOINTMENT (OUTPATIENT)
Age: 29
End: 2024-01-23

## 2024-01-23 ENCOUNTER — APPOINTMENT (OUTPATIENT)
Dept: ORTHOPEDIC SURGERY | Facility: CLINIC | Age: 29
End: 2024-01-23
Payer: COMMERCIAL

## 2024-01-23 PROCEDURE — 99441: CPT

## 2024-01-23 RX ORDER — MELOXICAM 15 MG/1
15 TABLET ORAL DAILY
Qty: 30 | Refills: 1 | Status: ACTIVE | COMMUNITY
Start: 2022-09-16 | End: 1900-01-01

## 2024-01-23 RX ORDER — GABAPENTIN 100 MG/1
100 CAPSULE ORAL
Qty: 90 | Refills: 1 | Status: DISCONTINUED | COMMUNITY
Start: 2022-12-06 | End: 2024-01-23

## 2024-01-25 ENCOUNTER — APPOINTMENT (OUTPATIENT)
Dept: ORTHOPEDIC SURGERY | Facility: CLINIC | Age: 29
End: 2024-01-25
Payer: COMMERCIAL

## 2024-01-25 DIAGNOSIS — G57.31 LESION OF LATERAL POPLITEAL NERVE, RIGHT LOWER LIMB: ICD-10-CM

## 2024-01-25 PROCEDURE — 99214 OFFICE O/P EST MOD 30 MIN: CPT

## 2024-01-26 RX ORDER — GABAPENTIN 300 MG/1
300 CAPSULE ORAL DAILY
Qty: 110 | Refills: 0 | Status: ACTIVE | COMMUNITY
Start: 2024-01-26 | End: 1900-01-01

## 2024-05-21 ENCOUNTER — APPOINTMENT (OUTPATIENT)
Dept: ORTHOPEDIC SURGERY | Facility: CLINIC | Age: 29
End: 2024-05-21
Payer: COMMERCIAL

## 2024-05-21 VITALS
BODY MASS INDEX: 25.84 KG/M2 | DIASTOLIC BLOOD PRESSURE: 77 MMHG | HEART RATE: 74 BPM | SYSTOLIC BLOOD PRESSURE: 115 MMHG | HEIGHT: 73 IN | WEIGHT: 195 LBS | OXYGEN SATURATION: 97 % | TEMPERATURE: 97.8 F

## 2024-05-21 DIAGNOSIS — M17.11 UNILATERAL PRIMARY OSTEOARTHRITIS, RIGHT KNEE: ICD-10-CM

## 2024-05-21 PROCEDURE — 99213 OFFICE O/P EST LOW 20 MIN: CPT

## 2024-05-23 PROBLEM — M17.11 PRIMARY OSTEOARTHRITIS OF RIGHT KNEE: Status: ACTIVE | Noted: 2018-06-12

## 2024-05-23 NOTE — HISTORY OF PRESENT ILLNESS
[de-identified] : Rishabh returns today for evaluation of his right knee. he has lateral compartment DJD. he has had several opinions regarding osteotomies. he has ongoing lateral knee pain and swelling even with light exercise. HE has had difficulty walking and pain with stairs. He denies any locking or buckling. he is limited by pain in his ADLS

## 2024-05-23 NOTE — PHYSICAL EXAM
[de-identified] :  The patient is a well developed, well nourished male in no apparent distress. He is alert and oriented X 3 with a pleasant mood and appropriate affect.    On physical examination of the right knee,his ROM is 0-125 degrees. The patient walks with a normal gait and stands in neutral alignment. There is trace effusion. No warmth or erythema is noted. The patella is non tender to palpation medially or laterally. There is no crepitus noted. The apprehension and grind tests are negative. The extensor mechanism is intact. There is lateral joint line tenderness. The Keshav sign is positive. The Lachman and pivot shift tests are negative. There is no varus or valgus laxity at 0 or 30 degrees. No posterolateral or anteromedial laxity is noted. No masses are palpable. No other soft tissue or bony tenderness is noted. Quadriceps weakness is noted. Neurovascular function is intact.    On physical examination of the  left knee,his ROM is 0-125 degrees. The patient walks with a normal gait and stands in neutral alignment. There is no effusion. No warmth or erythema is noted. The patella is mildly  tender to palpation medially and  laterally. There is no crepitus noted. The apprehension and grind tests are negative. The extensor mechanism is intact. There is no  joint line tenderness. The Keshav sign is negative. The Lachman and pivot shift tests are negative. There is no varus or valgus laxity at 0 or 30 degrees. No posterolateral or anteromedial laxity is noted. No masses are palpable. No other soft tissue or bony tenderness is noted. Quadriceps weakness is noted. Neurovascular function is intact.  [de-identified] : Radiographs of both knees performed 3-23 show moderate lateral compartment DJD right knee

## 2024-05-23 NOTE — DISCUSSION/SUMMARY
[de-identified] : Rishabh has symptomatic lateral compartment DJD in his right knee. He would benefit from femoral osteotomy and we will refer him onward in that regard. all questions were answered. He will call if any issues arise